# Patient Record
Sex: FEMALE | Race: BLACK OR AFRICAN AMERICAN | NOT HISPANIC OR LATINO | Employment: UNEMPLOYED | URBAN - METROPOLITAN AREA
[De-identification: names, ages, dates, MRNs, and addresses within clinical notes are randomized per-mention and may not be internally consistent; named-entity substitution may affect disease eponyms.]

---

## 2018-02-22 ENCOUNTER — HOSPITAL ENCOUNTER (EMERGENCY)
Facility: OTHER | Age: 33
Discharge: HOME | End: 2018-02-22
Attending: EMERGENCY MEDICINE
Payer: COMMERCIAL

## 2018-02-22 VITALS
WEIGHT: 240 LBS | DIASTOLIC BLOOD PRESSURE: 80 MMHG | HEIGHT: 61 IN | SYSTOLIC BLOOD PRESSURE: 130 MMHG | HEART RATE: 105 BPM | BODY MASS INDEX: 45.31 KG/M2 | OXYGEN SATURATION: 99 % | TEMPERATURE: 97 F | RESPIRATION RATE: 16 BRPM

## 2018-02-22 DIAGNOSIS — J40 BRONCHITIS: Primary | ICD-10-CM

## 2018-02-22 LAB
B-HCG UR QL: NEGATIVE
CTP QC/QA: YES

## 2018-02-22 PROCEDURE — 81025 URINE PREGNANCY TEST: CPT | Performed by: EMERGENCY MEDICINE

## 2018-02-22 PROCEDURE — 99284 EMERGENCY DEPT VISIT MOD MDM: CPT | Mod: 25

## 2018-02-22 RX ORDER — AZITHROMYCIN 250 MG/1
500 TABLET, FILM COATED ORAL DAILY
Qty: 6 TABLET | Refills: 0 | Status: ON HOLD | OUTPATIENT
Start: 2018-02-22 | End: 2019-02-27

## 2018-02-22 RX ORDER — BENZONATATE 100 MG/1
100 CAPSULE ORAL 3 TIMES DAILY PRN
Qty: 20 CAPSULE | Refills: 0 | Status: SHIPPED | OUTPATIENT
Start: 2018-02-22 | End: 2018-03-04

## 2018-02-23 NOTE — ED TRIAGE NOTES
"Pt presents to the ED with c/o cough x 1 month. Productive, "green and watery". + SOB x 2 weeks, active and at rest. Pt denies any other associated symptoms. Pt denies previous hx, states her mom told her she had asthma as a child but she does not recall.   "

## 2018-02-23 NOTE — ED PROVIDER NOTES
"Encounter Date: 2/22/2018    SCRIBE #1 NOTE: I, Stephanie Blunt, am scribing for, and in the presence of, Dr. Guadarrama.       History     Chief Complaint   Patient presents with    Cough     Patient c/o a cough that started off productive with mucus and since has been dry for approx 3 weeks.  Patient stated she started to have SOB recently with the coughing.  No CP.      Time seen by provider: 8:07 PM    This is a 33 y.o. female who presents with complaint of cough which started about one month ago. Patient states that cough started as productive but has been non-productive over the past few weeks. She reports associated chest pain with coughing. She additionally reports recent onset of shortness of breath with ambulation or lying down flat. Patient says that she was informed of "enlarged heart" one year ago. She denies fever, chills, nausea, vomiting, light-headedness, dizziness, palpitations, leg swelling, or diaphoresis. She does not currently have PCP established. She has no additional complaints at this time.      The history is provided by the patient.     Review of patient's allergies indicates:  No Known Allergies  History reviewed. No pertinent past medical history.  History reviewed. No pertinent surgical history.  History reviewed. No pertinent family history.  Social History   Substance Use Topics    Smoking status: Never Smoker    Smokeless tobacco: Never Used    Alcohol use No     Review of Systems   Constitutional: Negative for chills, diaphoresis and fever.   HENT: Negative for congestion and sore throat.    Respiratory: Positive for cough (non-productive) and shortness of breath.    Cardiovascular: Positive for chest pain (with coughing). Negative for palpitations and leg swelling.   Gastrointestinal: Negative for abdominal pain, constipation, diarrhea, nausea and vomiting.   Genitourinary: Negative for dysuria.   Musculoskeletal: Negative for back pain.   Skin: Negative for rash.   Neurological: " Negative for dizziness, weakness and light-headedness.   Hematological: Does not bruise/bleed easily.       Physical Exam     Initial Vitals [02/22/18 1947]   BP Pulse Resp Temp SpO2   (!) 113/55 88 16 98.3 °F (36.8 °C) 98 %      MAP       74.33         Physical Exam    Nursing note and vitals reviewed.  Constitutional: She appears well-developed and well-nourished. She is not diaphoretic. No distress.   HENT:   Head: Normocephalic and atraumatic.   Mouth/Throat: Oropharynx is clear and moist. No oropharyngeal exudate.   Eyes: Conjunctivae and EOM are normal. Pupils are equal, round, and reactive to light.   Neck: Normal range of motion. Neck supple.   Cardiovascular: Normal rate, regular rhythm, normal heart sounds and intact distal pulses. Exam reveals no gallop and no friction rub.    No murmur heard.  Pulmonary/Chest: Breath sounds normal. No respiratory distress. She has no wheezes. She has no rhonchi. She has no rales.   Abdominal: Soft. She exhibits no distension. There is no tenderness. There is no rebound and no guarding.   Musculoskeletal: She exhibits no edema or tenderness.   Neurological: She is alert and oriented to person, place, and time.   Skin: Skin is warm and dry. No rash noted. No erythema.         ED Course   Procedures  Labs Reviewed   POCT URINE PREGNANCY     Imaging Results          X-Ray Chest PA And Lateral (Final result)  Result time 02/22/18 20:46:47    Final result by Conner Sumner MD (02/22/18 20:46:47)                 Impression:     No acute cardiopulmonary process, hypoventilatory exam.          Electronically signed by: CONNER SUMNER MD  Date:     02/22/18  Time:    20:46              Narrative:    Chest PA and lateral    Indication:Cough    Comparison:None    Findings:  The cardiomediastinal silhouette is not enlarged.  There is no pleural effusion.  The trachea is midline.  The lungs are symmetrically expanded bilaterally without evidence of acute parenchymal process. No  large focal consolidation seen.  There is no pneumothorax.  The osseous structures are unremarkable.                                   X-Rays:   Independently Interpreted Readings:   Chest X-Ray: No cardiomegaly. Trachea midline. No obvious infiltrate or effusion.     Medical Decision Making:   Initial Assessment:   Patient presents with one month of cough. Will check a CXR. Does not appear to be in heart failure on exam. Highly doubt acute coronary syndrome or PE.  Independently Interpreted Test(s):   I have ordered and independently interpreted X-rays - see prior notes.  Clinical Tests:   Lab Tests: Reviewed and Ordered  Radiological Study: Ordered and Reviewed  ED Management:  9:25PM- Will discharge patient home on antibiotics and tessalon.            Scribe Attestation:   Scribe #1: I performed the above scribed service and the documentation accurately describes the services I performed. I attest to the accuracy of the note.    Attending Attestation:           Physician Attestation for Scribe:  Physician Attestation Statement for Scribe #1: I, Dr. Guadarrama, reviewed documentation, as scribed by Stephanie Blunt in my presence, and it is both accurate and complete.                    Clinical Impression:     1. Bronchitis          Disposition:   Disposition: Discharged  Condition: Stable                        Henri Guadarrama MD  02/22/18 2777

## 2019-02-27 PROBLEM — J03.91 ACUTE RECURRENT TONSILLITIS: Status: ACTIVE | Noted: 2019-02-27

## 2019-02-27 PROBLEM — J34.3 HYPERTROPHY OF NASAL TURBINATES: Status: ACTIVE | Noted: 2019-02-27

## 2024-07-12 ENCOUNTER — TELEPHONE (OUTPATIENT)
Dept: SURGERY | Facility: CLINIC | Age: 39
End: 2024-07-12
Payer: COMMERCIAL

## 2024-07-12 NOTE — TELEPHONE ENCOUNTER
called placed to pt in response to inbasket message and she Is interested in a breast reduction     She has a BMI of 30   Current Wt- 159 /Ht 5' 1     She specifically asked for Dr Pimentel.    Gastric sleeve 2 years ago accord to pt.    Advised pt I would send over her info to their office and Dr Pimentel'office would reach out -  No additional concerns at this time. All questions and concerns addressed. Pt voiced understanding.

## 2024-07-12 NOTE — TELEPHONE ENCOUNTER
----- Message from Agnes Monroe sent at 7/12/2024 10:33 AM CDT -----  Regarding: appt  Contact: @ [phone]  Pt requesting a appointment for the following Breast (Augmentation, Reduction, Reconstruction, Tissue Expanders / Implants) no available dates  ...Please call and adv @ [phone]

## 2024-08-14 ENCOUNTER — OFFICE VISIT (OUTPATIENT)
Dept: PLASTIC SURGERY | Facility: CLINIC | Age: 39
End: 2024-08-14
Payer: COMMERCIAL

## 2024-08-14 VITALS
DIASTOLIC BLOOD PRESSURE: 62 MMHG | HEIGHT: 60 IN | WEIGHT: 163.13 LBS | SYSTOLIC BLOOD PRESSURE: 102 MMHG | BODY MASS INDEX: 32.02 KG/M2 | HEART RATE: 79 BPM

## 2024-08-14 DIAGNOSIS — M25.511 CHRONIC PAIN IN RIGHT SHOULDER: ICD-10-CM

## 2024-08-14 DIAGNOSIS — G89.29 CHRONIC PAIN IN LEFT SHOULDER: ICD-10-CM

## 2024-08-14 DIAGNOSIS — M25.512 CHRONIC PAIN IN LEFT SHOULDER: ICD-10-CM

## 2024-08-14 DIAGNOSIS — N62 HYPERTROPHY OF BREAST: Primary | ICD-10-CM

## 2024-08-14 DIAGNOSIS — L30.4 ERYTHEMA INTERTRIGO: ICD-10-CM

## 2024-08-14 DIAGNOSIS — M54.2 CERVICALGIA: ICD-10-CM

## 2024-08-14 DIAGNOSIS — G89.29 CHRONIC PAIN IN RIGHT SHOULDER: ICD-10-CM

## 2024-08-14 PROCEDURE — 3008F BODY MASS INDEX DOCD: CPT | Mod: CPTII,S$GLB,, | Performed by: SURGERY

## 2024-08-14 PROCEDURE — 99204 OFFICE O/P NEW MOD 45 MIN: CPT | Mod: S$GLB,,, | Performed by: SURGERY

## 2024-08-14 PROCEDURE — 99999 PR PBB SHADOW E&M-EST. PATIENT-LVL III: CPT | Mod: PBBFAC,,, | Performed by: SURGERY

## 2024-08-14 PROCEDURE — 1159F MED LIST DOCD IN RCRD: CPT | Mod: CPTII,S$GLB,, | Performed by: SURGERY

## 2024-08-14 PROCEDURE — 3074F SYST BP LT 130 MM HG: CPT | Mod: CPTII,S$GLB,, | Performed by: SURGERY

## 2024-08-14 PROCEDURE — 3078F DIAST BP <80 MM HG: CPT | Mod: CPTII,S$GLB,, | Performed by: SURGERY

## 2024-08-14 RX ORDER — MONTELUKAST SODIUM 10 MG/1
10 TABLET ORAL
COMMUNITY
Start: 2024-07-15

## 2024-08-14 RX ORDER — METOPROLOL SUCCINATE 25 MG/1
25 TABLET, EXTENDED RELEASE ORAL
COMMUNITY

## 2024-08-14 RX ORDER — CYCLOBENZAPRINE HCL 10 MG
TABLET ORAL
COMMUNITY

## 2024-08-14 RX ORDER — CALCIUM CARB/VITAMIN D3/VIT K1 650MG-12.5
TABLET,CHEWABLE ORAL
COMMUNITY

## 2024-08-14 RX ORDER — TIRZEPATIDE 7.5 MG/.5ML
INJECTION, SOLUTION SUBCUTANEOUS
COMMUNITY

## 2024-08-14 RX ORDER — IBUPROFEN 800 MG/1
800 TABLET ORAL 3 TIMES DAILY
COMMUNITY
Start: 2024-08-08

## 2024-08-14 RX ORDER — ERGOCALCIFEROL 1.25 MG/1
CAPSULE ORAL
COMMUNITY
Start: 2024-06-29

## 2024-08-14 NOTE — PROGRESS NOTES
.History & Physical    SUBJECTIVE:   Chief complaint: large breasts    History of Present Illness:  Chente Casas presents to [facility] 2ND FLOOR on 8/14/2024 for evaluation for bilateral breast reduction secondary to symptomatic bilateral large pendulous breast. She has a chief complaint of chronic neck and back pain for several years. She has tried OTC pain meds, muscle relaxers without alleviation of pain. She also complains of deep shoulder grooving from her bra straps as well as macerating rashes below each breast that have not significantly improved with application of medicated ointments and creams.  She currently reports a bra size of 44H. 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 Denies fam hx or personal hx of breast cancer    PMH: afib on metoprolol  PSH: gastric sleeve 2022. Lost 100 lbs    Past Medical History:   Diagnosis Date    Pre-diabetes        Past Surgical History:   Procedure Laterality Date    TONSILLECTOMY Bilateral 02/27/2019    TONSILLECTOMY Bilateral 2/27/2019    Procedure: TONSILLECTOMY;  Surgeon: Kenan Hoover MD;  Location: [facility];  Service: ENT;  Laterality: Bilateral;    TURBINATE RESECTION Bilateral 02/27/2019       Current Outpatient Medications   Medication Sig Dispense Refill    ALBUTEROL INHL       calcium-vitamin D3-vitamin K 650 mg-12.5 mcg-40 mcg Chew 0.25 ml Orally Once a day for 30 day(s)      cyclobenzaprine (FLEXERIL) 10 MG tablet       ergocalciferol (ERGOCALCIFEROL) 50,000 unit Cap Take by mouth.      ibuprofen (ADVIL,MOTRIN) 800 MG tablet Take 800 mg by mouth 3 (three) times daily.      metoprolol succinate (TOPROL-XL) 25 MG 24 hr tablet Take 25 mg by mouth.      montelukast (SINGULAIR) 10 mg tablet Take 10 mg by mouth.      MOUNJARO 7.5 mg/0.5 mL PnIj Inject into the skin.      metFORMIN (GLUCOPHAGE) 500 MG tablet Take 500 mg by mouth 2 (two) times daily with meals.        No current facility-administered medications for this visit.     Facility-Administered Medications Ordered in Other Visits   Medication Dose Route Frequency Provider Last Rate Last Admin    lactated ringers infusion   Intravenous Continuous Kenan Hoover MD 75 mL/hr at 02/27/19 0632 New Bag at 02/27/19 0632    sodium chloride 0.9% flush 3 mL  3 mL Intravenous PRN Kenan Hoover MD           Review of Systems:   HENT: Positive for neck pain.    Musculoskeletal: Positive for back pain.   Neurological: Negative for headaches or dizziness      OBJECTIVE:     /62 (BP Location: Right arm, Patient Position: Sitting, BP Method: Medium (Automatic))   Pulse 79   Ht 5' (1.524 m)   Wt 74 kg (163 lb 2.3 oz)   BMI 31.86 kg/m²       Physical Exam:  WD WN NAD  VSS  Normal resp effort  Chest: Effort normal and breath sounds normal. Bilaterally enlarged breasts, evidence of previous rashes, shoulder grooving, no palpable masses, nipple everted. 15cm nipple to inframmmary fold  Abdominal pannus      Last MMG negative 1 year ago    ASSESSMENT/PLAN:     1.Symptomatic Bilateral Macromastia  2. Chronic neck pain  3. Chronic back pain  4. Chronic breast rashes    PLAN:Plan    -Pt was seen and evaluated by myself and Dr. Alvin Pimentel.   -Plan for bilateral breast reduction and will need approximately 900 gm reduction per side with the intention of symptomatic relief.   - will need cardiac clearance prior to surgery.   - discusssed possible free nipple graft vs resection and 3d tatoo  -Will submit paper work for insurance approval  -Photos to be obtained  -Risk, benefits, and alternatives explained. She understands that the risks include but are not limited to bleeding, scarring, infection, pain, numbness, asymmetry, deformity, open wound, skin necrosis, wound dehiscence, permanent or temporary loss of sensation to the nipple, partial or total nipple loss requiring removal, nipple malposition, poor cosmetic outcome,  hematoma, seroma and pulmonary emobolus. Discussed with patient that we will not remove any axillary breast tissue or any tissue towards the back as we will not flip the patient during surgery. The patient fully understands that she may have axillary fullness and would like to proceed.   - Patient understands that this may not relieve her of all of her symptoms; however, she wishes to proceed.   - Patient would like to proceed with scheduling bilateral breast reduction pending insurance authorization  - My time with the patient includes face to face time and non-face to face time preparing to see the patient (eg, review of tests), obtaining and/or reviewing separately obtained history, documenting clinical information in the electronic or other health record, independently interpreting results and communicating results to the patient/family/caregiver, or care coordinator.    All questions were answered. The patient was advised to call the clinic with any questions or concerns prior to their next visit.     MD Zohra Rai/[facility]

## 2024-12-18 ENCOUNTER — OFFICE VISIT (OUTPATIENT)
Dept: PLASTIC SURGERY | Facility: CLINIC | Age: 39
End: 2024-12-18
Payer: COMMERCIAL

## 2024-12-18 VITALS — HEART RATE: 80 BPM | SYSTOLIC BLOOD PRESSURE: 119 MMHG | DIASTOLIC BLOOD PRESSURE: 55 MMHG

## 2024-12-18 DIAGNOSIS — G89.29 CHRONIC PAIN IN RIGHT SHOULDER: ICD-10-CM

## 2024-12-18 DIAGNOSIS — G89.29 CHRONIC PAIN IN LEFT SHOULDER: ICD-10-CM

## 2024-12-18 DIAGNOSIS — M25.512 CHRONIC PAIN IN LEFT SHOULDER: ICD-10-CM

## 2024-12-18 DIAGNOSIS — N62 HYPERTROPHY OF BREAST: Primary | ICD-10-CM

## 2024-12-18 DIAGNOSIS — L30.4 ERYTHEMA INTERTRIGO: ICD-10-CM

## 2024-12-18 DIAGNOSIS — M25.511 CHRONIC PAIN IN RIGHT SHOULDER: ICD-10-CM

## 2024-12-18 DIAGNOSIS — M54.2 CERVICALGIA: ICD-10-CM

## 2024-12-18 PROCEDURE — 3078F DIAST BP <80 MM HG: CPT | Mod: CPTII,S$GLB,, | Performed by: SURGERY

## 2024-12-18 PROCEDURE — 3074F SYST BP LT 130 MM HG: CPT | Mod: CPTII,S$GLB,, | Performed by: SURGERY

## 2024-12-18 PROCEDURE — 99214 OFFICE O/P EST MOD 30 MIN: CPT | Mod: S$GLB,,, | Performed by: SURGERY

## 2024-12-18 PROCEDURE — 3044F HG A1C LEVEL LT 7.0%: CPT | Mod: CPTII,S$GLB,, | Performed by: SURGERY

## 2024-12-18 PROCEDURE — 1159F MED LIST DOCD IN RCRD: CPT | Mod: CPTII,S$GLB,, | Performed by: SURGERY

## 2024-12-18 PROCEDURE — 99999 PR PBB SHADOW E&M-EST. PATIENT-LVL III: CPT | Mod: PBBFAC,,, | Performed by: SURGERY

## 2024-12-18 NOTE — PROGRESS NOTES
.History & Physical    SUBJECTIVE:   Chief complaint: large breasts    History of Present Illness:  Chente Casas presents to [facility] 2ND FLOOR on 12/18/2024 for evaluation for bilateral breast reduction secondary to symptomatic bilateral large pendulous breast. She has a chief complaint of chronic neck and back pain for several years. She has tried OTC pain meds, muscle relaxers without alleviation of pain. She also complains of deep shoulder grooving from her bra straps as well as macerating rashes below each breast that have not significantly improved with application of medicated ointments and creams.  She currently reports a bra size of 44H. 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 Denies fam hx or personal hx of breast cancer    Interval events 12/18/24:  Patient previously seen for BBR however she also would like to get a panniculectomy at the same time; macerating rashes beneath an abdominal wall pannus. Patient had a gastric sleeve procedure in 2022. Patient has lost 100 lbs. Weight has been stable for 6 months.   Patient states rashes have a foul-smelling odor, causes excoriation, and has been treated by PCP. Patient has tried medicated ointments and creams without relief of symptoms.   Patient also complains of lower back pain. Patient has tried without relief.   Patient denies any urinary incontinence.       PMH: afib on metoprolol  PSH: gastric sleeve 2022. Lost 100 lbs    Past Medical History:   Diagnosis Date    Pre-diabetes        Past Surgical History:   Procedure Laterality Date    TONSILLECTOMY Bilateral 02/27/2019    TONSILLECTOMY Bilateral 2/27/2019    Procedure: TONSILLECTOMY;  Surgeon: Kenan Hoover MD;  Location: [facility];  Service: ENT;  Laterality: Bilateral;    TURBINATE RESECTION Bilateral 02/27/2019       Current Outpatient Medications   Medication Sig Dispense Refill    ALBUTEROL INHL        calcium-vitamin D3-vitamin K 650 mg-12.5 mcg-40 mcg Chew 0.25 ml Orally Once a day for 30 day(s)      cyclobenzaprine (FLEXERIL) 10 MG tablet       ergocalciferol (ERGOCALCIFEROL) 50,000 unit Cap Take by mouth.      ibuprofen (ADVIL,MOTRIN) 800 MG tablet Take 800 mg by mouth 3 (three) times daily.      metoprolol succinate (TOPROL-XL) 25 MG 24 hr tablet Take 25 mg by mouth.      montelukast (SINGULAIR) 10 mg tablet Take 10 mg by mouth.      MOUNJARO 7.5 mg/0.5 mL PnIj Inject into the skin.      metFORMIN (GLUCOPHAGE) 500 MG tablet Take 500 mg by mouth 2 (two) times daily with meals.       No current facility-administered medications for this visit.     Facility-Administered Medications Ordered in Other Visits   Medication Dose Route Frequency Provider Last Rate Last Admin    lactated ringers infusion   Intravenous Continuous Kenan Hoover MD 75 mL/hr at 02/27/19 0632 New Bag at 02/27/19 0632    sodium chloride 0.9% flush 3 mL  3 mL Intravenous PRN Kenan Hoover MD           Review of Systems:   HENT: Positive for neck pain.    Musculoskeletal: Positive for back pain.   Neurological: Negative for headaches or dizziness      OBJECTIVE:     BP (!) 119/55 (BP Location: Right arm, Patient Position: Sitting)   Pulse 80       Physical Exam:  Height: 5' Weight: 74kg  WD WN NAD  VSS  Normal resp effort  Chest: Effort normal and breath sounds normal. Bilaterally enlarged breasts, evidence of previous rashes, shoulder grooving, no palpable masses, nipple everted. 15cm nipple to inframmmary fold  Abdominal pannus that reaches the top of her pubic hair      Last MMG negative 1 year ago    ASSESSMENT/PLAN:     1.Symptomatic Bilateral Macromastia  2. Chronic neck pain  3. Chronic back pain  4. Chronic breast rashes    PLAN:Plan    -Pt was seen and evaluated by myself and Dr. Alvin Pimentel.   -Plan for bilateral breast reduction and will need approximately 900 gm reduction per side with the intention of symptomatic  relief.   - will need cardiac clearance prior to surgery.   - discusssed possible free nipple graft vs resection and 3d tattoo  - also plan for panniculectomy  -Will submit paper work for insurance approval  -Photos to be obtained  -Risk, benefits, and alternatives explained. She understands that the risks include but are not limited to bleeding, scarring, infection, pain, numbness, asymmetry, deformity, open wound, skin necrosis, wound dehiscence, permanent or temporary loss of sensation to the nipple, partial or total nipple loss requiring removal, nipple malposition, poor cosmetic outcome, hematoma, seroma and pulmonary emobolus. Discussed with patient that we will not remove any axillary breast tissue or any tissue towards the back as we will not flip the patient during surgery. The patient fully understands that she may have axillary fullness and would like to proceed.   - Patient understands that this may not relieve her of all of her symptoms; however, she wishes to proceed.   Pt also understands that she would not be thin after this operation in any way shape or form.  I discussed with the patient that there is nothing at all cosmetic about this procedure.  This is a fully insurance procedure where we will only remove the excess skin that is causing the rashes, the back pain.  She understands there will be no liposuction and no abdominal wall plication.  Again, this is an insurance panniculectomy only and this patient will also have the umbilicus removed at this time. The pt understands all this.   - Patient would like to proceed with scheduling bilateral breast reduction and panniculectomy pending insurance authorization  - My time with the patient includes face to face time and non-face to face time preparing to see the patient (eg, review of tests), obtaining and/or reviewing separately obtained history, documenting clinical information in the electronic or other health record, independently interpreting  results and communicating results to the patient/family/caregiver, or care coordinator.    All questions were answered. The patient was advised to call the clinic with any questions or concerns prior to their next visit.     Katlyn Jasmine MD  [facility], PGY-1